# Patient Record
Sex: MALE | Race: WHITE | HISPANIC OR LATINO
[De-identification: names, ages, dates, MRNs, and addresses within clinical notes are randomized per-mention and may not be internally consistent; named-entity substitution may affect disease eponyms.]

---

## 2017-04-11 ENCOUNTER — APPOINTMENT (OUTPATIENT)
Dept: CARDIOLOGY | Facility: CLINIC | Age: 37
End: 2017-04-11

## 2017-04-11 PROBLEM — Z00.00 ENCOUNTER FOR PREVENTIVE HEALTH EXAMINATION: Status: ACTIVE | Noted: 2017-04-11

## 2017-04-19 ENCOUNTER — APPOINTMENT (OUTPATIENT)
Dept: CARDIOLOGY | Facility: CLINIC | Age: 37
End: 2017-04-19

## 2017-05-10 ENCOUNTER — APPOINTMENT (OUTPATIENT)
Dept: CARDIOLOGY | Facility: CLINIC | Age: 37
End: 2017-05-10

## 2017-05-16 ENCOUNTER — APPOINTMENT (OUTPATIENT)
Dept: CARDIOLOGY | Facility: CLINIC | Age: 37
End: 2017-05-16

## 2018-06-05 ENCOUNTER — NON-APPOINTMENT (OUTPATIENT)
Age: 38
End: 2018-06-05

## 2018-06-05 ENCOUNTER — APPOINTMENT (OUTPATIENT)
Dept: CARDIOLOGY | Facility: CLINIC | Age: 38
End: 2018-06-05
Payer: COMMERCIAL

## 2018-06-05 VITALS
BODY MASS INDEX: 26.51 KG/M2 | SYSTOLIC BLOOD PRESSURE: 122 MMHG | HEIGHT: 73 IN | WEIGHT: 200 LBS | OXYGEN SATURATION: 99 % | DIASTOLIC BLOOD PRESSURE: 70 MMHG | HEART RATE: 91 BPM

## 2018-06-05 PROCEDURE — 93000 ELECTROCARDIOGRAM COMPLETE: CPT

## 2018-06-05 PROCEDURE — 99214 OFFICE O/P EST MOD 30 MIN: CPT

## 2019-08-15 ENCOUNTER — APPOINTMENT (OUTPATIENT)
Dept: CARDIOLOGY | Facility: CLINIC | Age: 39
End: 2019-08-15
Payer: COMMERCIAL

## 2019-08-15 ENCOUNTER — NON-APPOINTMENT (OUTPATIENT)
Age: 39
End: 2019-08-15

## 2019-08-15 VITALS
DIASTOLIC BLOOD PRESSURE: 82 MMHG | WEIGHT: 200 LBS | BODY MASS INDEX: 26.51 KG/M2 | SYSTOLIC BLOOD PRESSURE: 120 MMHG | HEIGHT: 73 IN | OXYGEN SATURATION: 98 % | HEART RATE: 62 BPM

## 2019-08-15 DIAGNOSIS — Z78.9 OTHER SPECIFIED HEALTH STATUS: ICD-10-CM

## 2019-08-15 DIAGNOSIS — Z82.49 FAMILY HISTORY OF ISCHEMIC HEART DISEASE AND OTHER DISEASES OF THE CIRCULATORY SYSTEM: ICD-10-CM

## 2019-08-15 DIAGNOSIS — Z87.898 PERSONAL HISTORY OF OTHER SPECIFIED CONDITIONS: ICD-10-CM

## 2019-08-15 PROCEDURE — 93000 ELECTROCARDIOGRAM COMPLETE: CPT

## 2019-08-15 PROCEDURE — 99213 OFFICE O/P EST LOW 20 MIN: CPT | Mod: 25

## 2019-08-15 NOTE — ASSESSMENT
[FreeTextEntry1] : Chest discomfort: Patient has good exercise ability with exertional chest discomfort or shortness of breath. He occasionally gets chest discomfort unrelated to exertion., but has resolved this past year. Believes it to be acid reflux. \par \par Cholesterol: Patient states he had cholesterol drawn at his primary care doctor's office and was told it was normal. Reviewed labs from 2/22/2019 with patient.\par \par Follow up in 1 year with Dr. Noble.

## 2019-08-15 NOTE — REVIEW OF SYSTEMS
[Shortness Of Breath] : no shortness of breath [Dyspnea on exertion] : not dyspnea during exertion [Chest  Pressure] : no chest pressure [Lower Ext Edema] : no extremity edema [Chest Pain] : no chest pain [Palpitations] : no palpitations [Negative] : Heme/Lymph

## 2019-08-15 NOTE — PHYSICAL EXAM
[General Appearance - Well Developed] : well developed [Normal Appearance] : normal appearance [Well Groomed] : well groomed [General Appearance - Well Nourished] : well nourished [No Deformities] : no deformities [General Appearance - In No Acute Distress] : no acute distress [Normal Conjunctiva] : the conjunctiva exhibited no abnormalities [Eyelids - No Xanthelasma] : the eyelids demonstrated no xanthelasmas [No Oral Pallor] : no oral pallor [Normal Oral Mucosa] : normal oral mucosa [No Oral Cyanosis] : no oral cyanosis [Normal Jugular Venous A Waves Present] : normal jugular venous A waves present [No Jugular Venous [place] A Waves] : no jugular venous [place] A waves [Normal Jugular Venous V Waves Present] : normal jugular venous V waves present [Respiration, Rhythm And Depth] : normal respiratory rhythm and effort [Exaggerated Use Of Accessory Muscles For Inspiration] : no accessory muscle use [Auscultation Breath Sounds / Voice Sounds] : lungs were clear to auscultation bilaterally [Heart Rate And Rhythm] : heart rate and rhythm were normal [Heart Sounds] : normal S1 and S2 [Murmurs] : no murmurs present [Edema] : no peripheral edema present [Abnormal Walk] : normal gait [Gait - Sufficient For Exercise Testing] : the gait was sufficient for exercise testing [Nail Clubbing] : no clubbing of the fingernails [Cyanosis, Localized] : no localized cyanosis [Skin Turgor] : normal skin turgor [] : no rash [Impaired Insight] : insight and judgment were intact [Affect] : the affect was normal [Memory Recent] : recent memory was not impaired

## 2019-08-15 NOTE — HISTORY OF PRESENT ILLNESS
[FreeTextEntry1] : Chest discomfort: Patient has good exercise ability with exertional chest discomfort or shortness of breath. He occasionally gets chest discomfort unrelated to exertion., but has resolved this past year. Believes it to be acid reflux. \par \par Cholesterol: Patient states he had cholesterol drawn at his primary care doctor's office and was told it was normal.

## 2023-12-26 ENCOUNTER — APPOINTMENT (OUTPATIENT)
Dept: CARDIOLOGY | Facility: CLINIC | Age: 43
End: 2023-12-26
Payer: COMMERCIAL

## 2023-12-26 VITALS
OXYGEN SATURATION: 99 % | HEIGHT: 73 IN | HEART RATE: 76 BPM | DIASTOLIC BLOOD PRESSURE: 76 MMHG | BODY MASS INDEX: 26.51 KG/M2 | SYSTOLIC BLOOD PRESSURE: 122 MMHG | WEIGHT: 200 LBS

## 2023-12-26 PROCEDURE — 99205 OFFICE O/P NEW HI 60 MIN: CPT

## 2023-12-26 NOTE — ASSESSMENT
[FreeTextEntry1] : Cholesterol: The patient brings with him cholesterol profile and liver function testing.  The patient does not fit in a statin benefit group.  Dietary therapy has been recommended.  Elevated ALT: Follow-up his primary care has been recommended.  Chest discomfort/family history: His symptoms seem atypical for coronary artery disease.  Exercise stress testing has been reviewed.  We have reviewed the issues surrounding CT calcium scoring and CT coronary angiography which we will discuss after stress testing.  Shortness of breath: Seems like a normal variant.  Exercise stress testing and echocardiography have been reviewed.  ECG reviewed, echocardiography and stress testing from the past reviewed.  His chest discomfort is a new acute illness.  Any chest discomfort can pose threat to life or bodily dysfunction.  The patient has been advised to call 911 for any chest discomfort lasting over 15 minutes.

## 2023-12-26 NOTE — REASON FOR VISIT
[FreeTextEntry1] : The patient is complaining of an abnormal family history of coronary artery disease as well as chest pain.  The patient's father coronary disease in his 50s.  The patient's father was not a cigarette smoker.  The patient has good exercise capacity exercising at [employer] for 20 to 30 minutes.  Sometimes he has dyspnea on heavy exertion which he feels is normal for him.  About 3 weeks ago the patient developed chest discomfort syndrome.  He describes an uncomfortable tight like feeling on the left side of his chest.  It lasts up to all day long and then resolves.  If he takes a deep breath it goes away.   Sometimes it is fleeting in nature.  There are no other associated symptoms but when he is having difficulty taking a deep breath he feels that it affects his breathing.  There is been no change in the patient's baseline status.

## 2024-01-08 LAB — HBA1C MFR BLD HPLC: 5.3

## 2024-01-29 ENCOUNTER — APPOINTMENT (OUTPATIENT)
Dept: CARDIOLOGY | Facility: CLINIC | Age: 44
End: 2024-01-29
Payer: COMMERCIAL

## 2024-01-29 PROCEDURE — 93306 TTE W/DOPPLER COMPLETE: CPT

## 2024-02-02 ENCOUNTER — APPOINTMENT (OUTPATIENT)
Dept: CARDIOLOGY | Facility: CLINIC | Age: 44
End: 2024-02-02
Payer: COMMERCIAL

## 2024-02-02 VITALS
HEIGHT: 73 IN | WEIGHT: 200 LBS | SYSTOLIC BLOOD PRESSURE: 126 MMHG | DIASTOLIC BLOOD PRESSURE: 72 MMHG | BODY MASS INDEX: 26.51 KG/M2 | HEART RATE: 88 BPM | OXYGEN SATURATION: 98 %

## 2024-02-02 DIAGNOSIS — Z82.49 FAMILY HISTORY OF ISCHEMIC HEART DISEASE AND OTHER DISEASES OF THE CIRCULATORY SYSTEM: ICD-10-CM

## 2024-02-02 PROCEDURE — 99214 OFFICE O/P EST MOD 30 MIN: CPT | Mod: 25

## 2024-02-02 PROCEDURE — 93015 CV STRESS TEST SUPVJ I&R: CPT

## 2024-02-02 NOTE — PHYSICAL EXAM
PATIENT:ELIZABETH MEDRANO                          MEDICAL RECORD: [id number]
SEX: M                                                   LOCATION:[facility]         
[id number] Voice Confirmation ID: [id number] DOCUMENT ID: [id number]
 
 
                                           
                                           ELISHA HERNANDEZ MD             
 
 
 
Electronically Signed by ELISHA HERNANDEZ on 07/26/19 at 1114
 
 
 
 
 
 
 
 
 
CC:                                                             [id number]
DICTATION DATE: 07/24/19 1003     :     07/24/19 1022      DEP CLI 
                                                                      07/23/19
[id number] [place] [address] [Normal] : normal S1, S2, no murmur, no rub, no gallop

## 2024-02-02 NOTE — REASON FOR VISIT
[FreeTextEntry1] : The patient is seen for follow-up of his chest discomfort.  The patient was good exercise capacity without exertional symptoms.  He has not had an episode of chest discomfort in over 1 week.  The patient did have 1 or 2 recurrent episodes after he saw me last time.  The patient has good exercise capacity without exertional symptoms.

## 2024-02-02 NOTE — ASSESSMENT
[FreeTextEntry1] : Chest discomfort: Patient has recurrent episode of chest discomfort consistent with unstable angina pectoris.  In addition the patient has an exercise stress test consistent with ischemia.  The risk and benefit of CT coronary angiography have been reviewed.  Overall the patient is agreeable to proceed with CT angiography which I think is his best option.  Patient metabolic panel has been arranged for 1 week prior to CT coronary angiography.  There is no history of allergic reaction to contrast dye or iodine.  Family history of premature coronary artery disease: Plan will be to consider aspirin therapy after CT coronary angiography.  Cholesterol: Plan will be to consider statin therapy after CT coronary angiography.  Hemoglobin A1c on January 8, 2024 was 5.3.  On June 20, 2023 AST was 26 and ALT was slightly elevated at 41, LDL was 117 with a total cholesterol of 182.  Exercise ECG testing today was positive for myocardial ischemia.  Transthoracic echocardiography revealed normal left ventricular function and mild mitral insufficiency on January 29, 2024.

## 2024-02-04 ENCOUNTER — TRANSCRIPTION ENCOUNTER (OUTPATIENT)
Age: 44
End: 2024-02-04

## 2024-04-01 ENCOUNTER — APPOINTMENT (OUTPATIENT)
Dept: CARDIOLOGY | Facility: CLINIC | Age: 44
End: 2024-04-01
Payer: COMMERCIAL

## 2024-04-01 VITALS
SYSTOLIC BLOOD PRESSURE: 116 MMHG | OXYGEN SATURATION: 97 % | DIASTOLIC BLOOD PRESSURE: 76 MMHG | BODY MASS INDEX: 25.18 KG/M2 | HEIGHT: 73 IN | WEIGHT: 190 LBS | HEART RATE: 85 BPM

## 2024-04-01 DIAGNOSIS — R06.00 DYSPNEA, UNSPECIFIED: ICD-10-CM

## 2024-04-01 DIAGNOSIS — R07.9 CHEST PAIN, UNSPECIFIED: ICD-10-CM

## 2024-04-01 PROCEDURE — 99214 OFFICE O/P EST MOD 30 MIN: CPT

## 2024-04-01 NOTE — ASSESSMENT
[FreeTextEntry1] : Chest discomfort/shortness of breath: No recurrence.  The patient is fairly good exercise capacity without exertional symptoms.  Overall do not feel that he has any symptoms related to his cardiovascular system.  Abnormal CTA with regards to his esophagus: The patient is scheduled for endoscopy with his gastroenterologist.  CT angiography of the heart on 3/5/2024 revealed no evidence of coronary artery disease.  Exercise ECG testing was positive for ischemia on February 2, 2024.  Transthoracic echocardiography on 1/29/2024 revealed ejection fraction 60 to 65% with mild mitral insufficiency.

## 2024-04-01 NOTE — REASON FOR VISIT
[FreeTextEntry1] : The patient is seen for follow-up of chest discomfort.  The patient underwent noninvasive testing followed by CT coronary angiography.  CT coronary angiography revealed 0 calcium and no evidence of coronary artery disease.  The patient has fairly good exercise capacity without exertional symptoms.  There has been no recurrence of the previously described chest discomfort.